# Patient Record
(demographics unavailable — no encounter records)

---

## 2025-04-23 NOTE — HISTORY OF PRESENT ILLNESS
[FreeTextEntry1] : 27 year old female presents to establish care for weight management. She has been struggling with her weight for the past few years. 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 As she got older she developed depression and anxiety and was put on some obesogenic meds. About a year ago she was also drinking to excess. That now is down to 2 drinks most nights, but she is careful not to exceed that amount. She has tried to lose weight over the past year by limiting the alcohol, trying to decrease portion size, to go on her bike and treadmill at home. Nothing has resulted in medically meaningful weight loss that was sustainable. Her psychiatrist thought that one of the newer weight loss medications might be a good option for her and referred her here.  Physical Activity: Patient currently hasn't been doing any formal exercise. She has a bike ([employer]) and treadmill. Mom owns dumbbells  Nutrition: 24 hour recall: BF: Protein shake, hash browns, eggs Lunch: Mini cheeseburgers Dinner: Mac and cheese ([employer]) with broccoli Lunch and dinner are usually cheese and carbs. Snacks might be cheese and crackers or chicken salad and crackers  Fruit: Loves it -- has a smoothie most days Vegetables: broccoli, brussel sprouts, asparagus, potatoes, corn -- a few times per week Beans: Really likes chickpeas, but doesn't have them often Whole grains: Hasn't really tried them much. Used to eat oatmeal Dairy: Lots of cheese, milk, yogurt Fish: Once per week Meat: Beef -- not even once a week. Not too much chicken Likes cooking for herself. Doesn't like cleaning up after so one pot meals are probably a good suggestion

## 2025-04-23 NOTE — ASSESSMENT
[FreeTextEntry1] : Obesity/BMI 38:  Patient has struggled to lose the weight she gained over the past few years despite cutting down on caloric intake in the form of less EtOH and smaller portions and increased exercise. She has not been able to achieve and sustain a medically meaningful amount of weight loss and is interested in pharmaceutical options. Phentermine -- contraindicated with her anxiety and medications Contrave -- already on 450 mg of Wellbutrin. Additional naltrexone unlikely to give more than a few percentage points of weight loss which is not going to achieve her goals. GLP drugs -- Patient interested in trying Zepbound 2.5 mg weekly Discussed risks, benefits, and side effects of drugs including, but not limited to: Nausea, vomiting, diarrhea, vomiting, constipation. No personal history of thyroid cancer. Discussed pancreatitis. At this time no causation definitively noted, and it appears incidence in GLP-1 taking patients not statistically significantly greater than baseline, though some cases of pancreatitis have been reported, as in the general population. Patient denies history of pancreatitis; would avoid in patients with personal history of pancreatitis. Reports of gallbladder disease, which is common in patients who lose significant weight. Hypersensitivity reactions can occur in 2-5% of patients. Dizziness and fatigue occur in 2-7% of patients.   Discussed need for regular exercise, specifically weight bearing exercise, to mitigate muscle loss.   Discussed need for healthy nutrition. Caloric intake will decrease because of decreased portion sizes, so proper nutrition is all the more important to avoid malnutrition in the face of decreased appetite.   Discussed proper administration of drug.   Discussed tracking location of injection with date and then jotting down any perceived side effects.  Discussed titration schedule of drug. Explained some patients require titration up 1-2 doses before seeing any effect. Others have notable decreased appetite on the first dose.   Reviewed these drugs are simply a tool to aid in weight loss. Healthy nutrition, portion control, exercise, adequate sleep, stress reduction, avoidance/moderation of risky substances, etc. are all still vela to achieve optimal health.  Discussed healthy dietary patterns/principles of healthy nutrition including lean protein, low saturated fat, whole food carbohydrates as opposed to refined, high fiber, low sodium diet. Discussed physical activity and exercise guidelines. Patient received nutrition basics and mediterranean diet handouts.   She agreed to the following action plan: Zepbound 2.5 mg weekly  Follow up with me between the 3rd and 4th dose.   Physical Activity Saturday and Sunday mornings  Dumbbells/Bands  [employer]  One day: 15 minutes upper body/15 minutes core  The other day: 15 minutes lower body/15 minutes core   Nutrition  One serving of fruit every single day   Total time spent reviewing records, seeing patient, generating action plan, prescribing medication, documenting visit 65 minutes.

## 2025-04-23 NOTE — SOCIAL HISTORY
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

## 2025-04-23 NOTE — HISTORY OF PRESENT ILLNESS
[FreeTextEntry1] : 27 year old female presents to establish care for weight management. She has been struggling with her weight for the past few years. 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 As she got older she developed depression and anxiety and was put on some obesogenic meds. About a year ago she was also drinking to excess. That now is down to 2 drinks most nights, but she is careful not to exceed that amount. She has tried to lose weight over the past year by limiting the alcohol, trying to decrease portion size, to go on her bike and treadmill at home. Nothing has resulted in medically meaningful weight loss that was sustainable. Her psychiatrist thought that one of the newer weight loss medications might be a good option for her and referred her here.  Physical Activity: Patient currently hasn't been doing any formal exercise. She has a bike ([employer]) and treadmill. Mom owns dumbbells  Nutrition: 24 hour recall: BF: Protein shake, hash browns, eggs Lunch: Mini cheeseburgers Dinner: Mac and cheese ([employer]) with broccoli Lunch and dinner are usually cheese and carbs. Snacks might be cheese and crackers or chicken salad and crackers  Fruit: Loves it -- has a smoothie most days Vegetables: broccoli, brussel sprouts, asparagus, potatoes, corn -- a few times per week Beans: Really likes chickpeas, but doesn't have them often Whole grains: Hasn't really tried them much. Used to eat oatmeal Dairy: Lots of cheese, milk, yogurt Fish: Once per week Meat: Beef -- not even once a week. Not too much chicken Likes cooking for herself. Doesn't like cleaning up after so one pot meals are probably a good suggestion

## 2025-05-14 NOTE — ASSESSMENT
[FreeTextEntry1] : Obesity with BMI 37: Patient tolerating Zepbound very well and experiencing some appetite suppression.  We will increase her to 5 mg weekly and she will follow-up between the third and the fourth dose.    We talked about trying whole-wheat pasta to try to get more whole grains into the diet.  Whole-wheat pasta is not something she has ever tried before.  2 recipes given to patient.   Patient will continue with her cardio: Walks and hikes outside.  She will try to get the strength in once a week as a next goal, using her mother to hold her accountable.  If she is successful at that, we will add a second day for a week of strength next visit.

## 2025-05-14 NOTE — HISTORY OF PRESENT ILLNESS
[FreeTextEntry1] : 27-year-old female presents for follow-up.  She has been taking Zepbound 2.5 mg for 3 doses now.  The third dose was today.  She has not experienced any nausea, vomiting, heartburn, change in bowel habits, fatigue.  She has some normal regular fatigue that is all the time, but it does not appear to be worsened by the drug.  She is pleased with the appetite suppression.  She is ready to go up to the next dose.  Patient has done a good job getting 1 fruit in a day.  She usually actually gets more than 1 because she does a smoothie in the morning.  She has also been incorporating foods like chickpea salads and quinoa into her diet.  She walks or hikes outside, but the strength has been difficult to get motivated to do.  We had set the goal of Saturday and Sunday doing dumbbells or bands 15 minutes upper body and 15 minutes of core 1 day and 15 minutes of lower body and 15 minutes of core another day.  Her mother exercises regularly.  She might consider using her as an accountability partner, though her mother does more extensive workouts.  I explained she might be able to just use her to get her to do her own workout at the same time, not necessarily the same exercises.  She thought that might be possible.

## 2025-06-13 NOTE — HISTORY OF PRESENT ILLNESS
[FreeTextEntry1] : 27-year-old female presents for weight management follow-up.  She is on Zepbound 5 mg, tolerating it well.  Denied any side effects such as nausea, vomiting, diarrhea, constipation, fatigue, heartburn.    She has been doing a few walks a week with her mother.  They go about 2 miles in 40 to 45 minutes.  She also has added in 10-minute weight classes twice a week.  She is going to work on increasing the duration and incorporating all major muscle groups.  Nutrition has been a very big struggle for her.  She said there is not a lot of food in the house as a general rule.  Her father mostly cooks [employer] at nighttime.  Yesterday all she had was [employer] and cheese.  I explained to her that malnutrition is not a goal of this medication.  She needs to substantially improve the quality of her nutrition and dietary pattern.

## 2025-06-13 NOTE — ASSESSMENT
[FreeTextEntry1] : Obesity/BMI 36.98: Patient is tolerating the Zepbound very well and losing weight at a reasonable pace.  She is going to increase to the 7.5 mg dose, and likely she will remain there for a few months.  She will follow-up with me in about 1 month.  Commended her efforts at going for the walk with her mom 3 times a week.  Encouraged her to do strength training for a little bit longer and this time twice a week that may be 20 minutes total remembering to include upper body, lower body and core.  We had a significant discussion about the quality of her nutrition.  She is fiber deficient, protein deficient, and at age 27 I do not think that she can use the poor shopping habits of her parents of an excuse.  I strongly recommended that she  some frozen vegetables without creamy cheese or butter sauces.  We talked about incorporating more fruit.  Making sure yogurts are low-fat or fat-free without a significant amount of added sugar.  I think it would be helpful for her to meet with the RD and she agreed with that referral, so I made that referral for her.